# Patient Record
Sex: MALE | Race: AMERICAN INDIAN OR ALASKA NATIVE
[De-identification: names, ages, dates, MRNs, and addresses within clinical notes are randomized per-mention and may not be internally consistent; named-entity substitution may affect disease eponyms.]

---

## 2019-04-15 ENCOUNTER — HOSPITAL ENCOUNTER (OUTPATIENT)
Dept: HOSPITAL 5 - CT | Age: 64
Discharge: HOME | End: 2019-04-15
Attending: INTERNAL MEDICINE
Payer: MEDICAID

## 2019-04-15 DIAGNOSIS — I70.0: ICD-10-CM

## 2019-04-15 DIAGNOSIS — I10: ICD-10-CM

## 2019-04-15 DIAGNOSIS — K42.9: Primary | ICD-10-CM

## 2019-04-15 LAB — BUN SERPL-MCNC: 15 MG/DL (ref 9–20)

## 2019-04-15 PROCEDURE — 36415 COLL VENOUS BLD VENIPUNCTURE: CPT

## 2019-04-15 PROCEDURE — 71275 CT ANGIOGRAPHY CHEST: CPT

## 2019-04-15 PROCEDURE — 74174 CTA ABD&PLVS W/CONTRAST: CPT

## 2019-04-15 PROCEDURE — 82565 ASSAY OF CREATININE: CPT

## 2019-04-15 PROCEDURE — 84520 ASSAY OF UREA NITROGEN: CPT

## 2019-04-15 NOTE — CAT SCAN REPORT
PROCEDURE:  CT ANGIO CHEST 

 

TECHNIQUE: CT angiography of the chest was performed. IV contrast was administered. Axial images and 
coronal and sagittal reformatted images were obtained. 

 

HISTORY:  I10 ESSENTIAL HTN/I17.2 THORACIC AORTIC ANEURYSM WITHOUT RUPTURE 

 

COMPARISON: None 

 

FINDINGS: 

 

There is no aortic dissection seen. 

There is no significant thoracic aortic aneurysm. Maximum ascending aortic diameter is 3.9 cm. 

There are atherosclerotic calcifications in the thoracic aorta. 

There is no pulmonary embolism seen. PE evaluation is limited by breathing motion. This is especially
 notable in the lower lobes. 

There is no acute infiltrate seen. There is no pleural effusion or pneumothorax seen. 

  

IMPRESSION: 

There is no aortic dissection or aneurysm seen. Maximum ascending aortic diameter is 3.9 cm. 

No pulmonary embolus seen. PE evaluation is limited by breathing motion. 

 

This document is electronically signed by Angelique Shannon MD., April 15 2019 12:57:29 PM ET

## 2019-04-15 NOTE — CAT SCAN REPORT
PROCEDURE: CT ANGIO ABDOMEN PELVIS 

 

TECHNIQUE:  CTA of the abdomen and pelvis was performed after the administration of intravenous contr
ast. 

Rotating MIPS were included. 

 

HISTORY: ESSENTIAL (PRIMARY) HYPERTENSION 

 

COMPARISONS: None 

 

FINDINGS: 

 

Lower thorax: The lung bases are clear. The visualized portions of the heart demonstrate no abnormali
ty. 

Liver: Normal attenuation. There is a focal area of hyperenhancement along the periphery of the poste
rior aspect of the right hepatic lobe measuring 8 mm. 

Gallbladder/biliary system: No cholelithiasis. The common bile duct appears nondilated. 

Spleen: No splenic mass. 

Pancreas: No masses. No pancreatic duct dilation. 

Adrenal glands: No masses. 

Kidneys: No masses or cysts. No hydronephrosis. No renal or ureteral calculi. 

Vasculature: The abdominal aorta is nondilated. No abdominal aortic aneurysm or dissection. Mild calc
ified atherosclerotic plaque is seen within the abdominal aorta and branch vessels. No evidence of ve
ssel stenosis. There are 2 right renal arteries. There are 3 left renal arteries. 

Lymph nodes: No enlarged lymph nodes. 

Bowel: No bowel obstruction. No free fluid. No free air. The appendix is nondilated and noninflamed. 
No diverticulosis. 

Pelvis: Normal anatomy. No urinary bladder wall thickening or filling defects. 

Abdominal wall: Small fat-containing left inguinal hernia is seen. Small fat-containing umbilical her
gildardo is seen. 

Bones: No acute finding. 

 

IMPRESSION:  

 

 

1. No evidence of abdominal aortic aneurysm or dissection. 

2. Three left renal arteries and two right renal arteries without evidence of stenosis. 

3. Nonspecific hyperenhancing lesion in the periphery of the right hepatic lobe may represent a flash
 fill hemangioma versus other vascular malformation or other etiology.  

 

This document is electronically signed by Marianela Calderon., April 15 2019 01:39:59 PM ET

## 2022-06-03 ENCOUNTER — OUT OF OFFICE VISIT (OUTPATIENT)
Dept: URBAN - METROPOLITAN AREA MEDICAL CENTER 16 | Facility: MEDICAL CENTER | Age: 67
End: 2022-06-03
Payer: MEDICARE

## 2022-06-03 DIAGNOSIS — K92.1 ACUTE MELENA: ICD-10-CM

## 2022-06-03 DIAGNOSIS — Z79.82 ASPIRIN LONG-TERM USE: ICD-10-CM

## 2022-06-03 DIAGNOSIS — D64.89 ANEMIA DUE TO OTHER CAUSE: ICD-10-CM

## 2022-06-03 PROCEDURE — G8427 DOCREV CUR MEDS BY ELIG CLIN: HCPCS | Performed by: INTERNAL MEDICINE

## 2022-06-03 PROCEDURE — 99222 1ST HOSP IP/OBS MODERATE 55: CPT | Performed by: INTERNAL MEDICINE
